# Patient Record
Sex: FEMALE | URBAN - METROPOLITAN AREA
[De-identification: names, ages, dates, MRNs, and addresses within clinical notes are randomized per-mention and may not be internally consistent; named-entity substitution may affect disease eponyms.]

---

## 2019-10-21 ENCOUNTER — APPOINTMENT (OUTPATIENT)
Age: 54
Setting detail: DERMATOLOGY
End: 2019-10-22

## 2019-10-21 DIAGNOSIS — D22 MELANOCYTIC NEVI: ICD-10-CM

## 2019-10-21 DIAGNOSIS — R21 RASH AND OTHER NONSPECIFIC SKIN ERUPTION: ICD-10-CM

## 2019-10-21 PROBLEM — D22.4 MELANOCYTIC NEVI OF SCALP AND NECK: Status: ACTIVE | Noted: 2019-10-21

## 2019-10-21 PROCEDURE — OTHER INTRAMUSCULAR KENALOG: OTHER

## 2019-10-21 PROCEDURE — OTHER DEFER: OTHER

## 2019-10-21 PROCEDURE — OTHER MIPS QUALITY: OTHER

## 2019-10-21 PROCEDURE — OTHER REASSURANCE: OTHER

## 2019-10-21 PROCEDURE — 96372 THER/PROPH/DIAG INJ SC/IM: CPT

## 2019-10-21 PROCEDURE — OTHER RECOMMENDATIONS: OTHER

## 2019-10-21 PROCEDURE — OTHER COUNSELING: OTHER

## 2019-10-21 PROCEDURE — OTHER PRESCRIPTION: OTHER

## 2019-10-21 PROCEDURE — 99202 OFFICE O/P NEW SF 15 MIN: CPT | Mod: 25

## 2019-10-21 RX ORDER — FLUOCINONIDE 0.5 MG/G
CREAM TOPICAL
Qty: 1 | Refills: 3 | Status: ERX | COMMUNITY
Start: 2019-10-21

## 2019-10-21 ASSESSMENT — SEVERITY ASSESSMENT: SEVERITY: MILD TO MODERATE

## 2019-10-21 ASSESSMENT — BSA RASH: BSA RASH: 10

## 2019-10-21 ASSESSMENT — LOCATION DETAILED DESCRIPTION DERM
LOCATION DETAILED: LEFT BUTTOCK
LOCATION DETAILED: LEFT CENTRAL POSTAURICULAR SKIN

## 2019-10-21 ASSESSMENT — LOCATION SIMPLE DESCRIPTION DERM
LOCATION SIMPLE: LEFT BUTTOCK
LOCATION SIMPLE: SCALP

## 2019-10-21 ASSESSMENT — LOCATION ZONE DERM
LOCATION ZONE: SCALP
LOCATION ZONE: TRUNK

## 2019-10-21 NOTE — PROCEDURE: DEFER
Other Procedure: patch testing
Procedure To Be Performed At Next Visit: Biopsy by punch method
Detail Level: Detailed
Introduction Text (Please End With A Colon): defer:

## 2019-10-21 NOTE — PROCEDURE: MIPS QUALITY
Name And Contact Information For Health Care Proxy: Raiza Bolton \\nMother\\[phone] Name And Contact Information For Health Care Proxy: Raiza Bolton \\nMother\\[phone]

## 2019-10-21 NOTE — PROCEDURE: INTRAMUSCULAR KENALOG
Kenalog Preparation: kenalog
Treatment Number (Optional): 1
Detail Level: Simple
Concentration (Mg/Ml): 40.0
Consent: The risks of atrophy were reviewed with the patient.
Concentration (Mg/Ml) Of Additional Medication: 2.5
Administered By (Optional): Dr. Edhegard
Add Option For Additional Mediation: No

## 2019-10-21 NOTE — PROCEDURE: RECOMMENDATIONS
Detail Level: Zone
Recommendations (Free Text): Skin diet
Recommendation Preamble: The following recommendations were given:
8

## 2019-11-25 ENCOUNTER — APPOINTMENT (OUTPATIENT)
Age: 54
Setting detail: DERMATOLOGY
End: 2019-11-26

## 2019-11-25 DIAGNOSIS — R21 RASH AND OTHER NONSPECIFIC SKIN ERUPTION: ICD-10-CM

## 2019-11-25 PROCEDURE — OTHER COUNSELING: OTHER

## 2019-11-25 PROCEDURE — 99213 OFFICE O/P EST LOW 20 MIN: CPT

## 2019-11-25 PROCEDURE — OTHER RECOMMENDATIONS: OTHER

## 2019-11-25 PROCEDURE — OTHER MIPS QUALITY: OTHER

## 2019-11-25 ASSESSMENT — SEVERITY ASSESSMENT: SEVERITY: CLEAR

## 2019-11-25 NOTE — PROCEDURE: MIPS QUALITY
Name And Contact Information For Health Care Proxy: Raiza Bolton \\nMother\\[phone] Name And Contact Information For Health Care Proxy: Raiza Bolton \\nMother\\[phone]

## 2019-11-25 NOTE — PROCEDURE: RECOMMENDATIONS
Recommendation Preamble: The following recommendations were given:
Detail Level: Zone
Recommendations (Free Text): Skin diet

## 2020-01-21 ENCOUNTER — APPOINTMENT (OUTPATIENT)
Age: 55
Setting detail: DERMATOLOGY
End: 2020-01-22

## 2020-01-21 DIAGNOSIS — L71.0 PERIORAL DERMATITIS: ICD-10-CM

## 2020-01-21 PROCEDURE — OTHER RECOMMENDATIONS: OTHER

## 2020-01-21 PROCEDURE — OTHER COUNSELING: OTHER

## 2020-01-21 PROCEDURE — 99213 OFFICE O/P EST LOW 20 MIN: CPT

## 2020-01-21 PROCEDURE — OTHER MIPS QUALITY: OTHER

## 2020-01-21 NOTE — PROCEDURE: MIPS QUALITY
Name And Contact Information For Health Care Proxy: Raiza Bolton \\nMother\\[phone] Name And Contact Information For Health Care Proxy: Raiza Bolton \\nMother\\[phone]

## 2020-01-21 NOTE — PROCEDURE: RECOMMENDATIONS
Recommendations (Free Text): Addison'Malik Hayes Sulfur 10% OTC
Detail Level: Zone
Recommendation Preamble: The following recommendations were given: